# Patient Record
(demographics unavailable — no encounter records)

---

## 2025-04-29 NOTE — HISTORY OF PRESENT ILLNESS
[de-identified] : Patient here for evaluation right knee pain. anterior knee pain no trauma  NAD Right knee No skin breakdown ant TTP Positive patella grind PF crepitus Negative lachman Negative varus/valgus instability ROM 0-110 Pain with forced extension and flexion NVI Compartments soft and NT  Xray reviewed right knee grossly neg  Plan went over findings explained PFS pt script nsaids as needed fu in 2 months, if no improvement will get an mri

## 2025-06-13 NOTE — DISCUSSION/SUMMARY
[de-identified] : Right knee pain follow-up  HPI Patient is a 15-year-old female accompanied by her brother and older sister who reports to the office for subsequent evaluation of her right knee pain.  She has completed 6 weeks of formal PT which is giving her minimal relief.  Walking, up/down stairs, getting up from seated position, flexing extending the knee all aggravate the patient's pain.  Admits the knee buckle/give out on her occasionally.  Denies any numbness or tingling.  Right knee exam is as follows: Mild effusion noted.  No erythema or ecchymosis.  Able to perform active straight leg raise.  Knee flexion from 0 to 110 degrees with mild stiffness and pain.  Medial/lateral joint line tenderness palpation.  Calf soft nontender.  Positive Ephraim's.  Equivocal Lachman's.  Light touch intact throughout.  Quad/anterior strength 5 -/5.  Mild antalgic gait.  Assessment/plan Patient has failed conservative management including 6 weeks of formal PT.  Right knee MRI ordered for further evaluation.  Patient was advised to call the office a few days after getting the MRI done to discuss results over the phone.  The knee conditioning program from the [facility] was given to the patient so they may try that at home.  OTC NSAIDs as needed for pain.  The patient was advised to rest/ice the area and may alternate with warm compresses as needed.  Follow-up in 3 months.  All questions/concerns were answered in detail.